# Patient Record
Sex: FEMALE | Race: WHITE | URBAN - METROPOLITAN AREA
[De-identification: names, ages, dates, MRNs, and addresses within clinical notes are randomized per-mention and may not be internally consistent; named-entity substitution may affect disease eponyms.]

---

## 2019-04-17 ENCOUNTER — OFFICE (OUTPATIENT)
Dept: URBAN - METROPOLITAN AREA CLINIC 75 | Facility: CLINIC | Age: 24
End: 2019-04-17

## 2019-04-17 VITALS
WEIGHT: 147 LBS | DIASTOLIC BLOOD PRESSURE: 62 MMHG | HEART RATE: 70 BPM | HEIGHT: 66 IN | SYSTOLIC BLOOD PRESSURE: 128 MMHG

## 2019-04-17 DIAGNOSIS — R19.4 CHANGE IN BOWEL HABIT: ICD-10-CM

## 2019-04-17 PROCEDURE — 99202 OFFICE O/P NEW SF 15 MIN: CPT

## 2019-04-30 ENCOUNTER — HOSPITAL ENCOUNTER (EMERGENCY)
Facility: HOSPITAL | Age: 24
Discharge: HOME | End: 2019-04-30
Attending: EMERGENCY MEDICINE | Admitting: EMERGENCY MEDICINE

## 2019-04-30 VITALS
HEART RATE: 63 BPM | RESPIRATION RATE: 16 BRPM | SYSTOLIC BLOOD PRESSURE: 109 MMHG | TEMPERATURE: 98.2 F | HEIGHT: 65 IN | BODY MASS INDEX: 26.79 KG/M2 | WEIGHT: 160.8 LBS | OXYGEN SATURATION: 94 % | DIASTOLIC BLOOD PRESSURE: 66 MMHG

## 2019-04-30 DIAGNOSIS — H66.91 RIGHT OTITIS MEDIA, UNSPECIFIED OTITIS MEDIA TYPE: Primary | ICD-10-CM

## 2019-04-30 PROCEDURE — 99283 EMERGENCY DEPT VISIT LOW MDM: CPT

## 2019-04-30 RX ORDER — AMOXICILLIN AND CLAVULANATE POTASSIUM 875; 125 MG/1; MG/1
1 TABLET, FILM COATED ORAL ONCE
Status: COMPLETED | OUTPATIENT
Start: 2019-04-30 | End: 2019-04-30

## 2019-04-30 RX ORDER — AMOXICILLIN AND CLAVULANATE POTASSIUM 875; 125 MG/1; MG/1
1 TABLET, FILM COATED ORAL EVERY 12 HOURS
Qty: 20 TABLET | Refills: 0 | Status: SHIPPED | OUTPATIENT
Start: 2019-04-30 | End: 2022-09-09 | Stop reason: SDUPTHER

## 2019-04-30 RX ORDER — SPIRONOLACTONE 25 MG/1
75 TABLET ORAL 2 TIMES DAILY
COMMUNITY

## 2019-04-30 RX ORDER — HYDROCODONE BITARTRATE AND ACETAMINOPHEN 7.5; 325 MG/1; MG/1
1 TABLET ORAL ONCE
Status: COMPLETED | OUTPATIENT
Start: 2019-04-30 | End: 2019-04-30

## 2019-04-30 RX ORDER — FLUTICASONE PROPIONATE 50 MCG
2 SPRAY, SUSPENSION (ML) NASAL DAILY
Qty: 16 G | Refills: 0 | Status: SHIPPED | OUTPATIENT
Start: 2019-04-30

## 2019-04-30 RX ORDER — CETIRIZINE HYDROCHLORIDE 10 MG/1
10 TABLET ORAL DAILY
Qty: 20 TABLET | Refills: 0 | Status: SHIPPED | OUTPATIENT
Start: 2019-04-30

## 2019-04-30 RX ADMIN — HYDROCODONE BITARTRATE AND ACETAMINOPHEN 1 TABLET: 7.5; 325 TABLET ORAL at 04:55

## 2019-04-30 RX ADMIN — AMOXICILLIN AND CLAVULANATE POTASSIUM 1 TABLET: 875; 125 TABLET, FILM COATED ORAL at 04:55

## 2019-05-03 ENCOUNTER — HOSPITAL ENCOUNTER (EMERGENCY)
Facility: HOSPITAL | Age: 24
Discharge: HOME | End: 2019-05-03
Attending: EMERGENCY MEDICINE | Admitting: EMERGENCY MEDICINE

## 2019-05-03 ENCOUNTER — APPOINTMENT (OUTPATIENT)
Dept: GENERAL RADIOLOGY | Facility: HOSPITAL | Age: 24
End: 2019-05-03

## 2019-05-03 VITALS
TEMPERATURE: 98.3 F | WEIGHT: 160 LBS | HEART RATE: 57 BPM | DIASTOLIC BLOOD PRESSURE: 70 MMHG | RESPIRATION RATE: 16 BRPM | SYSTOLIC BLOOD PRESSURE: 123 MMHG | OXYGEN SATURATION: 99 % | BODY MASS INDEX: 26.66 KG/M2 | HEIGHT: 65 IN

## 2019-05-03 DIAGNOSIS — J06.9 UPPER RESPIRATORY TRACT INFECTION, UNSPECIFIED TYPE: ICD-10-CM

## 2019-05-03 DIAGNOSIS — H92.01 RIGHT EAR PAIN: Primary | ICD-10-CM

## 2019-05-03 PROCEDURE — 99283 EMERGENCY DEPT VISIT LOW MDM: CPT

## 2019-05-03 PROCEDURE — 71046 X-RAY EXAM CHEST 2 VIEWS: CPT

## 2019-05-03 RX ORDER — ALBUTEROL SULFATE 90 UG/1
2 AEROSOL, METERED RESPIRATORY (INHALATION) EVERY 4 HOURS PRN
Qty: 1 INHALER | Refills: 0 | Status: SHIPPED | OUTPATIENT
Start: 2019-05-03

## 2019-05-03 NOTE — ED NOTES
Pt and family member given apple juice and bags of chips as requested. No further needs at this time.      Anette Harris, RN  05/03/19 4914

## 2019-05-03 NOTE — ED NOTES
"This RN was called into pt's room. Pt expressing concern that \"I was sent here by a doctor that saw the micro-perforation and yellow drainage in my ear, but the doctor here says he doesn't see anything. I even had him look twice. All he seems to be focused on is my cough. I've had so many ear surgeries I know when I have an infection. I came here a couple of days ago and the doctor did not give me the proper medicine to treat my ear infection. I'm afraid I'm going to lose more hearing because I'm being misdiagnosed at this hospital. I need something for the pain in my ear and to help the infection. Do I need to talk to another ER doctor or call an ENT to come see me here?\" Informed pt that I would notify pt's primary ER physician. Dr. Sanchez informed at this time.      Anette Harris, RN  05/03/19 9321    "

## 2019-05-03 NOTE — ED NOTES
Pt has hx of asthmatic attacks without asthma dx and also complains of soa.  Pt in NOD at this time.     Stephanie Renteria N  05/03/19 5403

## 2019-05-03 NOTE — DISCHARGE INSTRUCTIONS
Continue Augmentin as directed  Contact the ear nose and throat doctor as per referral for further evaluation of the right ear pain.

## 2019-05-03 NOTE — ED NOTES
Came from [facility] clinic-dr thinks pt may have pneumonia and a perforated ear drum.  Pt has had several ear surgeries and complains of pain in right ear.     Stephanie Renteria N  05/03/19 5381

## 2019-05-03 NOTE — ED PROVIDER NOTES
EMERGENCY DEPARTMENT ENCOUNTER    CHIEF COMPLAINT  Chief Complaint: cough  History given by: patient  History limited by: nothing  Room Number:   PMD: Renzo Mullins MD      HPI:  Pt is a 23 y.o. female who presents complaining of an occasionally productive cough with yellow phlegm that began 48 hours ago. She also complains of SOA and a HA. She has left sided and central chest pain that is worse when she coughs. Pt denies fever. Pt has trouble with her right ear but has an apt with her ENT.     Duration/Onset/Timin hours, gradual, constant  Location: n/a  Radiation: n/a  Quality: occasionally productive with yellow phlegm  Intensity/Severity: moderate  Associated Symptoms: SOA, HA, left sided and central chest pain worse when she coughs, right ear pain  Aggravating or Alleviating Factors: none  Previous Episodes: none      PAST MEDICAL HISTORY  Active Ambulatory Problems     Diagnosis Date Noted   • No Active Ambulatory Problems     Resolved Ambulatory Problems     Diagnosis Date Noted   • No Resolved Ambulatory Problems     Past Medical History:   Diagnosis Date   • Sinus infection        PAST SURGICAL HISTORY  Past Surgical History:   Procedure Laterality Date   • EAR TUBES     • INNER EAR SURGERY      multiple   • MIDDLE EAR SURGERY      multiple       FAMILY HISTORY  History reviewed. No pertinent family history.    SOCIAL HISTORY  Social History     Socioeconomic History   • Marital status: Single     Spouse name: Not on file   • Number of children: Not on file   • Years of education: Not on file   • Highest education level: Not on file   Tobacco Use   • Smoking status: Never Smoker   Substance and Sexual Activity   • Alcohol use: Yes     Comment: socially   • Drug use: No       ALLERGIES  Patient has no known allergies.    REVIEW OF SYSTEMS  Review of Systems   Constitutional: Negative.  Negative for fever.   HENT: Positive for ear pain (right (chronic)). Negative for sore throat.    Eyes:  Negative.    Respiratory: Positive for cough (productive with yellow phelgm) and shortness of breath.    Cardiovascular: Positive for chest pain (left sided and central, worse with coughing).   Gastrointestinal: Negative.    Genitourinary: Negative.  Negative for dysuria.   Musculoskeletal: Negative.  Negative for back pain.   Skin: Negative.  Negative for rash.   Neurological: Positive for headaches.   All other systems reviewed and are negative.      PHYSICAL EXAM  ED Triage Vitals   Temp Heart Rate Resp BP SpO2   05/03/19 1319 05/03/19 1319 05/03/19 1333 05/03/19 1333 05/03/19 1319   98.3 °F (36.8 °C) 77 16 105/85 98 %      Temp src Heart Rate Source Patient Position BP Location FiO2 (%)   05/03/19 1319 05/03/19 1319 05/03/19 1333 05/03/19 1333 --   Tympanic Monitor Sitting Right arm        Physical Exam   Constitutional: No distress.   HENT:   Head: Normocephalic and atraumatic.   Right ear-partial cerumen occlusion was removed with loop.  The right TM is abnormal in appearance likely related to prior surgical repair.  I see no obvious perforation or signs of infection.   Eyes: Conjunctivae are normal.   Neck: Normal range of motion.   Cardiovascular: Normal rate and regular rhythm.   Pulmonary/Chest: No respiratory distress. She has rhonchi (moderate rhonchi bilaterally). She has no rales.   Abdominal: There is no tenderness.   Musculoskeletal: She exhibits no edema or tenderness.   Neurological: She is alert.   Skin: No rash noted.   Nursing note and vitals reviewed.      RADIOLOGY  XR Chest 2 View    (Results Pending)      Reviewed CXR which shows nothing acute. Independently viewed by me. Interpreted by radiologist.    I ordered the above noted radiological studies. Interpreted by radiologist.  Reviewed by me in PACS.       PROCEDURES  Procedures      PROGRESS AND CONSULTS     1421  Ordered CXR for further evaluation.     1532  Rechecked Pt who is resting comfortably. Informed her that her CXR shows nothing  acute. I will give her an inhaler to treat her cough. Discussed plans for discharge and instructed her to continue taking the augmentin that she was given when she was seen here recently in the ER for ear pain.  Instructed her to keep her scheduled f/u with ENT so that she can have further evaluation of her ear. Pt understands and agrees to all plans. All questions answered.     MEDICAL DECISION MAKING  Results were reviewed/discussed with the patient and they were also made aware of online access. Pt also made aware that some labs, such as cultures, will not be resulted during ER visit and follow up with PMD is necessary.     MDM  Number of Diagnoses or Management Options     Amount and/or Complexity of Data Reviewed  Tests in the radiology section of CPT®: ordered and reviewed (CXR shows nothing acute.)           DIAGNOSIS  Final diagnoses:   Right ear pain   Upper respiratory tract infection, unspecified type       DISPOSITION  DISCHARGE    Patient discharged in stable condition.    Reviewed implications of results, diagnosis, meds, responsibility to follow up, warning signs and symptoms of possible worsening, potential complications and reasons to return to ER, including new or worsening symptoms.    Patient/Family voiced understanding of above instructions.    Discussed plan for discharge, as there is no emergent indication for admission. Patient referred to primary care provider for BP management due to today's BP. Pt/family is agreeable and understands need for follow up and repeat testing.  Pt is aware that discharge does not mean that nothing is wrong but it indicates no emergency is present that requires admission and they must continue care with follow-up as given below or physician of their choice.     FOLLOW-UP  Byron Antoine MD  [address] [id number] [phone]      Call for Appointment         Medication List      New Prescriptions    albuterol sulfate  (90 Base)  MCG/ACT inhaler  Commonly known as:  PROVENTIL HFA;VENTOLIN HFA;PROAIR HFA  Inhale 2 puffs Every 4 (Four) Hours As Needed for Wheezing.              Latest Documented Vital Signs:  As of 3:35 PM  BP- 115/85 HR- 57 Temp- 98.3 °F (36.8 °C) (Tympanic) O2 sat- 98%    --  Documentation assistance provided by lonnie Alfaro for Dr. Sanchez.  Information recorded by the scribe was done at my direction and has been verified and validated by me.       Aurea Alfaro  05/03/19 5325       Dano Sanchez MD  05/03/19 7420

## 2019-06-19 ENCOUNTER — OFFICE (OUTPATIENT)
Dept: URBAN - METROPOLITAN AREA CLINIC 75 | Facility: CLINIC | Age: 24
End: 2019-06-19

## 2019-06-19 VITALS
DIASTOLIC BLOOD PRESSURE: 84 MMHG | WEIGHT: 149 LBS | SYSTOLIC BLOOD PRESSURE: 126 MMHG | HEIGHT: 66 IN | HEART RATE: 58 BPM

## 2019-06-19 DIAGNOSIS — K59.00 CONSTIPATION, UNSPECIFIED: ICD-10-CM

## 2019-06-19 DIAGNOSIS — R10.9 UNSPECIFIED ABDOMINAL PAIN: ICD-10-CM

## 2019-06-19 PROCEDURE — 99214 OFFICE O/P EST MOD 30 MIN: CPT | Performed by: NURSE PRACTITIONER

## 2019-06-19 RX ORDER — DICYCLOMINE HYDROCHLORIDE 10 MG/1
30 CAPSULE ORAL
Qty: 90 | Refills: 4 | Status: ACTIVE
Start: 2019-06-19

## 2022-09-09 ENCOUNTER — HOSPITAL ENCOUNTER (EMERGENCY)
Facility: HOSPITAL | Age: 27
Discharge: HOME | End: 2022-09-09
Attending: EMERGENCY MEDICINE | Admitting: EMERGENCY MEDICINE

## 2022-09-09 VITALS
SYSTOLIC BLOOD PRESSURE: 118 MMHG | OXYGEN SATURATION: 99 % | DIASTOLIC BLOOD PRESSURE: 73 MMHG | TEMPERATURE: 97.6 F | HEART RATE: 91 BPM | RESPIRATION RATE: 18 BRPM

## 2022-09-09 DIAGNOSIS — H60.501 ACUTE OTITIS EXTERNA OF RIGHT EAR, UNSPECIFIED TYPE: ICD-10-CM

## 2022-09-09 DIAGNOSIS — H66.91 RIGHT OTITIS MEDIA, UNSPECIFIED OTITIS MEDIA TYPE: Primary | ICD-10-CM

## 2022-09-09 PROCEDURE — 99282 EMERGENCY DEPT VISIT SF MDM: CPT

## 2022-09-09 RX ORDER — AMOXICILLIN AND CLAVULANATE POTASSIUM 875; 125 MG/1; MG/1
1 TABLET, FILM COATED ORAL EVERY 12 HOURS
Qty: 20 TABLET | Refills: 0 | Status: SHIPPED | OUTPATIENT
Start: 2022-09-09

## 2022-09-09 RX ORDER — CIPROFLOXACIN/HYDROCORTISONE 0.2 %-1 %
3 SUSPENSION, DROPS(FINAL DOSAGE FORM)(ML) OTIC (EAR) 2 TIMES DAILY
Qty: 10 ML | Refills: 0 | Status: SHIPPED | OUTPATIENT
Start: 2022-09-09

## 2022-09-09 NOTE — ED TRIAGE NOTES
PAtient to ER via car from home for Right ear pain x 8-9 days. Jaylene states she was seen and put on abx but itsn not getting better    Patient wearing mask this RN in PPE

## 2022-09-09 NOTE — ED PROVIDER NOTES
EMERGENCY DEPARTMENT ENCOUNTER  I wore full protective equipment throughout this patient encounter including a N95 mask, eye shield, gown and gloves. Hand hygiene was performed before donning protective equipment and after removal when leaving the room.    Room Number:  B01/01  Date of encounter:  9/9/2022  PCP: Renzo Mullins MD    HPI:  Context: Natalya Huynh is a 26 y.o. female who presents to the ED c/o chief complaint of right ear pain.  Patient reports that she was seen at outside emergency department approximately 8 days ago, diagnosed with otitis media and otitis externa.  Patient reports that she was started on oral antibiotics as well as a drop for her ear, reports that she has been taking antibiotics without relief, was not able to afford the drops for her ear secondary to lack of insurance although she now has insurance again.  Patient complains of continued pain and pressure in her ear.  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  Patient denies any external swelling, no fever shakes chills or night sweats.    MEDICAL HISTORY REVIEW  Reviewed in EPIC    PAST MEDICAL HISTORY  Active Ambulatory Problems     Diagnosis Date Noted   • No Active Ambulatory Problems     Resolved Ambulatory Problems     Diagnosis Date Noted   • No Resolved Ambulatory Problems     Past Medical History:   Diagnosis Date   • Sinus infection        PAST SURGICAL HISTORY  Past Surgical History:   Procedure Laterality Date   • EAR TUBES     • INNER EAR SURGERY      multiple   • MIDDLE EAR SURGERY      multiple       FAMILY HISTORY  No family history on file.    SOCIAL HISTORY  Social History     Socioeconomic History   • Marital status: Single   Tobacco Use   • Smoking status: Never Smoker   • Smokeless tobacco: Never Used   Substance and Sexual Activity   • Alcohol use: Yes     Comment: socially   • Drug use: No       ALLERGIES  Patient has no known  allergies.    The patient's allergies have been reviewed    REVIEW OF SYSTEMS  All systems reviewed and negative except for those discussed in HPI.     PHYSICAL EXAM  I have reviewed the triage vital signs and nursing notes.  ED Triage Vitals [09/09/22 1316]   Temp Heart Rate Resp BP SpO2   97.6 °F (36.4 °C) 91 18 -- 99 %      Temp src Heart Rate Source Patient Position BP Location FiO2 (%)   -- -- -- -- --       General: No acute distress.  HENT: NCAT, PERRL, Nares patent.  Right external ear is normal in appearance, normal lie to the pinna, no mastoid swelling or tenderness, no pain with manipulation of tragus.  Ear canal is extremely swollen, slightly erythematous.  Difficult to visualize tympanic membrane but small area of tympanic membrane visualized, membrane intact, membrane bulging.  Eyes: no scleral icterus.  Neck: trachea midline, no ROM limitations.  CV: regular rhythm, regular rate.  Respiratory: normal effort, CTAB.  Abdomen: soft, nondistended, NTTP, no rebound tenderness, no guarding or rigidity.  Musculoskeletal: no deformity.  Neuro: alert, moves all extremities, follows commands.  Skin: warm, dry.    LAB RESULTS  No results found for this or any previous visit (from the past 24 hour(s)).    I ordered the above labs and reviewed the results.    RADIOLOGY  No Radiology Exams Resulted Within Past 24 Hours    I ordered the above noted radiological studies. I reviewed the images and results. I agree with the radiologist interpretation.    PROCEDURES  Procedures    MEDICATIONS GIVEN IN ER  Medications - No data to display    PROGRESS, DATA ANALYSIS, CONSULTS, AND MEDICAL DECISION MAKING  A complete history and physical exam have been performed.  All available laboratory and imaging results have been reviewed by myself prior to disposition.    MDM  After the initial H&P, I discussed pertinent information from history and physical exam with patient/family.  Discussed differential diagnosis.  Discussed plan  for ED evaluation/workup/treatment.  All questions answered.  Patient/family is agreeable with plan.  ED Course as of 09/09/22 1404   Fri Sep 09, 2022   1404 Patient with otitis externa otitis media.  Patient has not completed treatment with antibiotics but is not having improvement, will refill antibiotics for longer course, patient previously unable to afford drops or ears but now she states that her insurance should cover them, prescribing antibiotic drops.  Discussed need for close follow-up with primary care and ENT.  Given extensive discussion return precautions, discharging. [JG]      ED Course User Index  [JG] Mookie Radford MD       AS OF 14:04 EDT VITALS:    BP - 118/73  HR - 91  TEMP - 97.6 °F (36.4 °C)  O2 SATS - 99%    DIAGNOSIS  Final diagnoses:   Right otitis media, unspecified otitis media type   Acute otitis externa of right ear, unspecified type         DISPOSITION  DISCHARGE    Patient discharged in stable condition.    Reviewed implications of results, diagnosis, meds, responsibility to follow up, warning signs and symptoms of possible worsening, potential complications and reasons to return to ER.    Patient/Family voiced understanding of above instructions.    Discussed plan for discharge, as there is no emergent indication for admission. Patient referred to primary care provider for BP management due to today's BP. Pt/family is agreeable and understands need for follow up and repeat testing.  Pt is aware that discharge does not mean that nothing is wrong but it indicates no emergency is present that requires admission and they must continue care with follow-up as given below or physician of their choice.     FOLLOW-UP  Renzo Mullins MD  [address] [facility] [address]  [facility] [id number] [phone]    Schedule an appointment as soon as possible for a visit in 2 days  even if well    Bony Rudd MD  [address] [facility] [address]  [facility] [id number] [phone]    Schedule an  appointment as soon as possible for a visit in 2 days           Medication List      New Prescriptions    Cipro HC 0.2-1 % otic suspension  Generic drug: ciprofloxacin-hydrocortisone  Administer 3 drops to the right ear 2 (Two) Times a Day.           Where to Get Your Medications      These medications were sent to [facility] [address] [place] [address] [address] [facility] [address] - [phone] [facility] [phone]   [address] [facility], [place] [id number]    Phone: [phone]   · amoxicillin-clavulanate 875-125 MG per tablet  · Cipro HC 0.2-1 % otic suspension          Mookie Radford MD  09/09/22 3689

## 2024-01-03 ENCOUNTER — APPOINTMENT (OUTPATIENT)
Dept: GENERAL RADIOLOGY | Facility: HOSPITAL | Age: 29
End: 2024-01-03
Payer: MEDICAID

## 2024-01-03 ENCOUNTER — HOSPITAL ENCOUNTER (EMERGENCY)
Facility: HOSPITAL | Age: 29
Discharge: HOME | End: 2024-01-03
Attending: EMERGENCY MEDICINE | Admitting: EMERGENCY MEDICINE
Payer: MEDICAID

## 2024-01-03 VITALS
TEMPERATURE: 98.8 F | RESPIRATION RATE: 16 BRPM | SYSTOLIC BLOOD PRESSURE: 137 MMHG | OXYGEN SATURATION: 100 % | HEIGHT: 64 IN | BODY MASS INDEX: 31.58 KG/M2 | WEIGHT: 185 LBS | DIASTOLIC BLOOD PRESSURE: 67 MMHG | HEART RATE: 73 BPM

## 2024-01-03 DIAGNOSIS — U07.1 COVID-19 VIRUS INFECTION: ICD-10-CM

## 2024-01-03 DIAGNOSIS — R05.9 COUGH, UNSPECIFIED TYPE: Primary | ICD-10-CM

## 2024-01-03 LAB
B PARAPERT DNA SPEC QL NAA+PROBE: NOT DETECTED
B PERT DNA SPEC QL NAA+PROBE: NOT DETECTED
C PNEUM DNA NPH QL NAA+NON-PROBE: NOT DETECTED
FLUAV SUBTYP SPEC NAA+PROBE: NOT DETECTED
FLUBV RNA ISLT QL NAA+PROBE: NOT DETECTED
HADV DNA SPEC NAA+PROBE: NOT DETECTED
HCOV 229E RNA SPEC QL NAA+PROBE: NOT DETECTED
HCOV HKU1 RNA SPEC QL NAA+PROBE: NOT DETECTED
HCOV NL63 RNA SPEC QL NAA+PROBE: NOT DETECTED
HCOV OC43 RNA SPEC QL NAA+PROBE: NOT DETECTED
HMPV RNA NPH QL NAA+NON-PROBE: NOT DETECTED
HPIV1 RNA ISLT QL NAA+PROBE: NOT DETECTED
HPIV2 RNA SPEC QL NAA+PROBE: NOT DETECTED
HPIV3 RNA NPH QL NAA+PROBE: NOT DETECTED
HPIV4 P GENE NPH QL NAA+PROBE: NOT DETECTED
M PNEUMO IGG SER IA-ACNC: NOT DETECTED
RHINOVIRUS RNA SPEC NAA+PROBE: NOT DETECTED
RSV RNA NPH QL NAA+NON-PROBE: NOT DETECTED
SARS-COV-2 RNA NPH QL NAA+NON-PROBE: DETECTED

## 2024-01-03 PROCEDURE — 63710000001 ONDANSETRON ODT 4 MG TABLET DISPERSIBLE: Performed by: EMERGENCY MEDICINE

## 2024-01-03 PROCEDURE — 99283 EMERGENCY DEPT VISIT LOW MDM: CPT

## 2024-01-03 PROCEDURE — 71046 X-RAY EXAM CHEST 2 VIEWS: CPT

## 2024-01-03 PROCEDURE — 0202U NFCT DS 22 TRGT SARS-COV-2: CPT | Performed by: EMERGENCY MEDICINE

## 2024-01-03 RX ORDER — ONDANSETRON 4 MG/1
4 TABLET, ORALLY DISINTEGRATING ORAL ONCE
Status: COMPLETED | OUTPATIENT
Start: 2024-01-03 | End: 2024-01-03

## 2024-01-03 RX ORDER — IBUPROFEN 800 MG/1
800 TABLET ORAL ONCE
Status: COMPLETED | OUTPATIENT
Start: 2024-01-03 | End: 2024-01-03

## 2024-01-03 RX ADMIN — ONDANSETRON 4 MG: 4 TABLET, ORALLY DISINTEGRATING ORAL at 22:43

## 2024-01-03 RX ADMIN — IBUPROFEN 800 MG: 800 TABLET, FILM COATED ORAL at 22:43

## 2024-01-04 NOTE — ED PROVIDER NOTES
EMERGENCY DEPARTMENT ENCOUNTER    Room Number:  40/40  PCP: Renzo Mullins MD  Historian: Patient      HPI:  Chief Complaint: Cough and congestion  A complete HPI/ROS/PMH/PSH/SH/FH are unobtainable due to: None  Context: Natalya Huynh is a 28 y.o. female who presents to the ED c/o cough and congestion.  States she has had cough and congestion for a week.  Got worse over the last day.  Patient has had no diarrhea.  Has had some posttussive emesis.  Has had fevers and chills.  No chest pain pressure tightness.            PAST MEDICAL HISTORY  Active Ambulatory Problems     Diagnosis Date Noted    No Active Ambulatory Problems     Resolved Ambulatory Problems     Diagnosis Date Noted    No Resolved Ambulatory Problems     Past Medical History:   Diagnosis Date    Environmental allergies     Migraine     Sinus infection          PAST SURGICAL HISTORY  Past Surgical History:   Procedure Laterality Date    EAR TUBES      INNER EAR SURGERY      multiple    MIDDLE EAR SURGERY      multiple         FAMILY HISTORY  No family history on file.      SOCIAL HISTORY  Social History     Socioeconomic History    Marital status: Single   Tobacco Use    Smoking status: Never     Passive exposure: Current    Smokeless tobacco: Never   Vaping Use    Vaping Use: Never used   Substance and Sexual Activity    Alcohol use: Yes     Comment: socially    Drug use: No    Sexual activity: Defer         ALLERGIES  Gluten meal        REVIEW OF SYSTEMS  Review of Systems   Congestion body aches      PHYSICAL EXAM  ED Triage Vitals   Temp Heart Rate Resp BP SpO2   01/03/24 2012 01/03/24 2012 01/03/24 2012 01/03/24 2016 01/03/24 2012   98.8 °F (37.1 °C) 73 16 137/67 100 %      Temp src Heart Rate Source Patient Position BP Location FiO2 (%)   01/03/24 2012 01/03/24 2012 01/03/24 2016 01/03/24 2016 --   Tympanic Monitor Sitting Right arm        Physical Exam      GENERAL: no acute distress  HENT: nares patent  EYES: no scleral icterus  CV: regular  rhythm, normal rate  RESPIRATORY: normal effort  ABDOMEN: soft  MUSCULOSKELETAL: no deformity  NEURO: alert, moves all extremities, follows commands  PSYCH:  calm, cooperative  SKIN: warm, dry    Vital signs and nursing notes reviewed.          LAB RESULTS  Recent Results (from the past 24 hour(s))   Respiratory Panel PCR w/COVID-19(SARS-CoV-2) FRANCES/KEYON/MEHRAN/PAD/COR/STEPHAN In-House, NP Swab in UTM/VTM, 2 HR TAT - Swab, Nasopharynx    Collection Time: 01/03/24  8:49 PM    Specimen: Nasopharynx; Swab   Result Value Ref Range    ADENOVIRUS, PCR Not Detected Not Detected    Coronavirus 229E Not Detected Not Detected    Coronavirus HKU1 Not Detected Not Detected    Coronavirus NL63 Not Detected Not Detected    Coronavirus OC43 Not Detected Not Detected    COVID19 Detected (C) Not Detected - Ref. Range    Human Metapneumovirus Not Detected Not Detected    Human Rhinovirus/Enterovirus Not Detected Not Detected    Influenza A PCR Not Detected Not Detected    Influenza B PCR Not Detected Not Detected    Parainfluenza Virus 1 Not Detected Not Detected    Parainfluenza Virus 2 Not Detected Not Detected    Parainfluenza Virus 3 Not Detected Not Detected    Parainfluenza Virus 4 Not Detected Not Detected    RSV, PCR Not Detected Not Detected    Bordetella pertussis pcr Not Detected Not Detected    Bordetella parapertussis PCR Not Detected Not Detected    Chlamydophila pneumoniae PCR Not Detected Not Detected    Mycoplasma pneumo by PCR Not Detected Not Detected       Ordered the above labs and reviewed the results.        RADIOLOGY  XR Chest 2 View    Result Date: 1/3/2024  XR CHEST 2 VW-  INDICATION: Cough  COMPARISON: 5/3/2019      No focal consolidation. No pleural effusion or pneumothorax.  Normal size cardiomediastinal silhouette.  No focal osseous abnormality.   This report was finalized on 1/3/2024 10:36 PM by Dr. Delfin Friend M.D on Workstation: [id number] [employer]       Ordered the above noted radiological studies.  Chest x-ray  independently interpreted by me and shows no evidence of pneumonia          PROCEDURES  Procedures            MEDICATIONS GIVEN IN ER  Medications   ibuprofen (ADVIL,MOTRIN) tablet 800 mg (has no administration in time range)   ondansetron ODT (ZOFRAN-ODT) disintegrating tablet 4 mg (has no administration in time range)                   MEDICAL DECISION MAKING, PROGRESS, and CONSULTS     Discussion below represents my analysis of pertinent findings related to patient's condition, differential diagnosis, treatment plan and final disposition.      Additional sources:  - Discussed/ obtained information from independent historians: None    - External (non-ED) record review: Epic reviewed patient last seen by OB/GYN 9/6/2023 for gynecologic exam    - Chronic or social conditions impacting care: None    - Shared decision making: None      Orders placed during this visit:  Orders Placed This Encounter   Procedures    Respiratory Panel PCR w/COVID-19(SARS-CoV-2) FRANCES/KEYON/MEHRAN/PAD/COR/STEPHAN In-House, NP Swab in UTM/VTM, 2 HR TAT - Swab, Nasopharynx    XR Chest 2 View         Additional orders considered but not ordered:  None        Differential diagnosis includes but is not limited to:    COVID-19 infection versus pneumonia versus sepsis      Independent interpretation of labs, radiology studies, and discussions with consultants:  ED Course as of 01/03/24 2243   Wed Jan 03, 2024 2237 22:38 EST  Patient presents with cough congestion and appears to have COVID infection.  Chest x-ray to my eye appears that there is no pneumonia.  Patient will be discharged home.  Instructed to follow-up with primary provider. [SL]      ED Course User Index  [SL] Bhavesh Fernández MD                 DIAGNOSIS  Final diagnoses:   Cough, unspecified type   COVID-19 virus infection         DISPOSITION  DISCHARGE    Patient discharged in stable condition.    Reviewed implications of results, diagnosis, meds, responsibility to follow up, warning  signs and symptoms of possible worsening, potential complications and reasons to return to ER, including worsening symptoms    Patient/Family voiced understanding of above instructions.    Discussed plan for discharge, as there is no emergent indication for admission. Patient referred to primary care provider for BP management due to today's BP. Pt/family is agreeable and understands need for follow up and repeat testing.  Pt is aware that discharge does not mean that nothing is wrong but it indicates no emergency is present that requires admission and they must continue care with follow-up as given below or physician of their choice.     FOLLOW-UP  Renzo Mlulins MD  [address] [address] [phone]    Schedule an appointment as soon as possible for a visit            Medication List      No changes were made to your prescriptions during this visit.                  Latest Documented Vital Signs:  As of 22:43 EST  BP- 137/67 HR- 73 Temp- 98.8 °F (37.1 °C) (Tympanic) O2 sat- 100%              --    Please note that portions of this were completed with a voice recognition program.       Note Disclaimer: At [facility], we believe that sharing information builds trust and better relationships. You are receiving this note because you are receiving care at [facility] or recently visited. It is possible you will see health information before a provider has talked with you about it. This kind of information can be easy to misunderstand. To help you fully understand what it means for your health, we urge you to discuss this note with your provider.            Bhavesh Fernández MD  01/03/24 2383

## 2024-02-13 ENCOUNTER — HOSPITAL ENCOUNTER (EMERGENCY)
Facility: HOSPITAL | Age: 29
Discharge: HOME | End: 2024-02-13
Attending: EMERGENCY MEDICINE | Admitting: EMERGENCY MEDICINE
Payer: MEDICAID

## 2024-02-13 ENCOUNTER — APPOINTMENT (OUTPATIENT)
Dept: GENERAL RADIOLOGY | Facility: HOSPITAL | Age: 29
End: 2024-02-13
Payer: MEDICAID

## 2024-02-13 VITALS
BODY MASS INDEX: 32.49 KG/M2 | HEART RATE: 60 BPM | SYSTOLIC BLOOD PRESSURE: 118 MMHG | HEIGHT: 65 IN | OXYGEN SATURATION: 100 % | DIASTOLIC BLOOD PRESSURE: 87 MMHG | TEMPERATURE: 98 F | RESPIRATION RATE: 15 BRPM | WEIGHT: 195 LBS

## 2024-02-13 DIAGNOSIS — T63.91XA TOXIC EFFECT OF VENOM, ACCIDENTAL OR UNINTENTIONAL, INITIAL ENCOUNTER: ICD-10-CM

## 2024-02-13 DIAGNOSIS — L02.211 ABSCESS OF ABDOMINAL WALL: Primary | ICD-10-CM

## 2024-02-13 LAB
ALBUMIN SERPL-MCNC: 3.6 G/DL (ref 3.5–5.2)
ALBUMIN/GLOB SERPL: 1.3 G/DL
ALP SERPL-CCNC: 49 U/L (ref 39–117)
ALT SERPL W P-5'-P-CCNC: 9 U/L (ref 1–33)
ANION GAP SERPL CALCULATED.3IONS-SCNC: 8.2 MMOL/L (ref 5–15)
APTT PPP: 26.6 SECONDS (ref 24.3–38.1)
AST SERPL-CCNC: 13 U/L (ref 1–32)
BACTERIA UR QL AUTO: ABNORMAL /HPF
BASOPHILS # BLD AUTO: 0.03 10*3/MM3 (ref 0–0.2)
BASOPHILS NFR BLD AUTO: 0.5 % (ref 0–1.5)
BILIRUB SERPL-MCNC: 0.3 MG/DL (ref 0–1.2)
BILIRUB UR QL STRIP: NEGATIVE
BUN SERPL-MCNC: 10 MG/DL (ref 6–20)
BUN/CREAT SERPL: 12 (ref 7–25)
CALCIUM SPEC-SCNC: 8.7 MG/DL (ref 8.6–10.5)
CHLORIDE SERPL-SCNC: 107 MMOL/L (ref 98–107)
CK SERPL-CCNC: 64 U/L (ref 20–180)
CLARITY UR: ABNORMAL
CO2 SERPL-SCNC: 25.8 MMOL/L (ref 22–29)
COLOR UR: YELLOW
CREAT SERPL-MCNC: 0.83 MG/DL (ref 0.57–1)
D DIMER PPP FEU-MCNC: 0.46 MCGFEU/ML (ref 0–0.5)
DEPRECATED RDW RBC AUTO: 45.1 FL (ref 37–54)
EGFRCR SERPLBLD CKD-EPI 2021: 98 ML/MIN/1.73
EOSINOPHIL # BLD AUTO: 0.14 10*3/MM3 (ref 0–0.4)
EOSINOPHIL NFR BLD AUTO: 2.2 % (ref 0.3–6.2)
ERYTHROCYTE [DISTWIDTH] IN BLOOD BY AUTOMATED COUNT: 12.8 % (ref 12.3–15.4)
GLOBULIN UR ELPH-MCNC: 2.7 GM/DL
GLUCOSE SERPL-MCNC: 76 MG/DL (ref 65–99)
GLUCOSE UR STRIP-MCNC: NEGATIVE MG/DL
HCT VFR BLD AUTO: 40.2 % (ref 34–46.6)
HGB BLD-MCNC: 13.4 G/DL (ref 12–15.9)
HGB UR QL STRIP.AUTO: ABNORMAL
HYALINE CASTS UR QL AUTO: ABNORMAL /LPF
IMM GRANULOCYTES # BLD AUTO: 0.02 10*3/MM3 (ref 0–0.05)
IMM GRANULOCYTES NFR BLD AUTO: 0.3 % (ref 0–0.5)
INR PPP: 0.93 (ref 0.9–1.1)
KETONES UR QL STRIP: NEGATIVE
LEUKOCYTE ESTERASE UR QL STRIP.AUTO: NEGATIVE
LYMPHOCYTES # BLD AUTO: 1.88 10*3/MM3 (ref 0.7–3.1)
LYMPHOCYTES NFR BLD AUTO: 29.3 % (ref 19.6–45.3)
MCH RBC QN AUTO: 32.1 PG (ref 26.6–33)
MCHC RBC AUTO-ENTMCNC: 33.3 G/DL (ref 31.5–35.7)
MCV RBC AUTO: 96.4 FL (ref 79–97)
MONOCYTES # BLD AUTO: 0.77 10*3/MM3 (ref 0.1–0.9)
MONOCYTES NFR BLD AUTO: 12 % (ref 5–12)
NEUTROPHILS NFR BLD AUTO: 3.57 10*3/MM3 (ref 1.7–7)
NEUTROPHILS NFR BLD AUTO: 55.7 % (ref 42.7–76)
NITRITE UR QL STRIP: NEGATIVE
NRBC BLD AUTO-RTO: 0 /100 WBC (ref 0–0.2)
NT-PROBNP SERPL-MCNC: 368.8 PG/ML (ref 0–450)
PH UR STRIP.AUTO: 6 [PH] (ref 4.5–8)
PLATELET # BLD AUTO: 208 10*3/MM3 (ref 140–450)
PMV BLD AUTO: 11.7 FL (ref 6–12)
POTASSIUM SERPL-SCNC: 4.1 MMOL/L (ref 3.5–5.2)
PROT SERPL-MCNC: 6.3 G/DL (ref 6–8.5)
PROT UR QL STRIP: NEGATIVE
PROTHROMBIN TIME: 12.5 SECONDS (ref 12.1–15)
RBC # BLD AUTO: 4.17 10*6/MM3 (ref 3.77–5.28)
RBC # UR STRIP: ABNORMAL /HPF
REF LAB TEST METHOD: ABNORMAL
SODIUM SERPL-SCNC: 141 MMOL/L (ref 136–145)
SP GR UR STRIP: 1.03 (ref 1–1.03)
SQUAMOUS #/AREA URNS HPF: ABNORMAL /HPF
TROPONIN T SERPL HS-MCNC: <6 NG/L
UROBILINOGEN UR QL STRIP: ABNORMAL
WBC # UR STRIP: ABNORMAL /HPF
WBC NRBC COR # BLD AUTO: 6.41 10*3/MM3 (ref 3.4–10.8)

## 2024-02-13 PROCEDURE — 80053 COMPREHEN METABOLIC PANEL: CPT | Performed by: EMERGENCY MEDICINE

## 2024-02-13 PROCEDURE — 96374 THER/PROPH/DIAG INJ IV PUSH: CPT

## 2024-02-13 PROCEDURE — 85730 THROMBOPLASTIN TIME PARTIAL: CPT | Performed by: EMERGENCY MEDICINE

## 2024-02-13 PROCEDURE — 82550 ASSAY OF CK (CPK): CPT | Performed by: EMERGENCY MEDICINE

## 2024-02-13 PROCEDURE — 85379 FIBRIN DEGRADATION QUANT: CPT | Performed by: EMERGENCY MEDICINE

## 2024-02-13 PROCEDURE — 84484 ASSAY OF TROPONIN QUANT: CPT | Performed by: EMERGENCY MEDICINE

## 2024-02-13 PROCEDURE — 71045 X-RAY EXAM CHEST 1 VIEW: CPT

## 2024-02-13 PROCEDURE — 85025 COMPLETE CBC W/AUTO DIFF WBC: CPT | Performed by: EMERGENCY MEDICINE

## 2024-02-13 PROCEDURE — 25010000002 KETOROLAC TROMETHAMINE PER 15 MG: Performed by: EMERGENCY MEDICINE

## 2024-02-13 PROCEDURE — 99283 EMERGENCY DEPT VISIT LOW MDM: CPT

## 2024-02-13 PROCEDURE — 85610 PROTHROMBIN TIME: CPT | Performed by: EMERGENCY MEDICINE

## 2024-02-13 PROCEDURE — 83880 ASSAY OF NATRIURETIC PEPTIDE: CPT | Performed by: EMERGENCY MEDICINE

## 2024-02-13 PROCEDURE — 81001 URINALYSIS AUTO W/SCOPE: CPT | Performed by: EMERGENCY MEDICINE

## 2024-02-13 RX ORDER — IBUPROFEN 600 MG/1
600 TABLET ORAL 3 TIMES DAILY
Qty: 30 TABLET | Refills: 0 | Status: SHIPPED | OUTPATIENT
Start: 2024-02-13

## 2024-02-13 RX ORDER — AMOXICILLIN AND CLAVULANATE POTASSIUM 875; 125 MG/1; MG/1
1 TABLET, FILM COATED ORAL 2 TIMES DAILY
Qty: 14 TABLET | Refills: 0 | Status: SHIPPED | OUTPATIENT
Start: 2024-02-13

## 2024-02-13 RX ORDER — KETOROLAC TROMETHAMINE 30 MG/ML
15 INJECTION, SOLUTION INTRAMUSCULAR; INTRAVENOUS ONCE
Status: COMPLETED | OUTPATIENT
Start: 2024-02-13 | End: 2024-02-13

## 2024-02-13 RX ADMIN — KETOROLAC TROMETHAMINE 15 MG: 30 INJECTION, SOLUTION INTRAMUSCULAR; INTRAVENOUS at 17:58

## 2024-05-14 ENCOUNTER — TELEMEDICINE (OUTPATIENT)
Dept: FAMILY MEDICINE CLINIC | Facility: TELEHEALTH | Age: 29
End: 2024-05-14
Payer: MEDICAID

## 2024-05-14 DIAGNOSIS — N89.8 VAGINAL ODOR: Primary | ICD-10-CM

## 2024-05-14 DIAGNOSIS — N89.8 VAGINAL ITCHING: ICD-10-CM

## 2024-05-14 PROBLEM — R68.81 EARLY SATIETY: Status: ACTIVE | Noted: 2019-03-20

## 2024-05-14 PROBLEM — R79.89 ELEVATED TESTOSTERONE LEVEL IN FEMALE: Status: ACTIVE | Noted: 2019-03-20

## 2024-05-14 PROBLEM — R10.2 PELVIC PAIN: Status: ACTIVE | Noted: 2019-03-20

## 2024-05-14 PROBLEM — N93.8 DUB (DYSFUNCTIONAL UTERINE BLEEDING): Status: ACTIVE | Noted: 2019-03-20

## 2024-05-14 PROCEDURE — 99213 OFFICE O/P EST LOW 20 MIN: CPT | Performed by: NURSE PRACTITIONER

## 2024-05-14 RX ORDER — BUPROPION HYDROCHLORIDE 150 MG/1
TABLET ORAL
COMMUNITY
Start: 2024-05-08

## 2024-05-14 RX ORDER — NORGESTIMATE AND ETHINYL ESTRADIOL 0.25-0.035
1 KIT ORAL DAILY
COMMUNITY
Start: 2023-09-06 | End: 2024-09-05

## 2024-05-15 NOTE — PROGRESS NOTES
"You have chosen to receive care through a telehealth visit.  Do you consent to use a video/audio connection for your medical care today? Yes     CHIEF COMPLAINT  Chief Complaint   Patient presents with    vaginal odor         HPI  Natalya Huynh is a 28 y.o. female  presents with complaint of \"bad vaginal odor.\" Reports she has to change her clothes several times a day and take 2 showers a day. Reports she is having alittle itching and no burning. Reports she is having frequency. Reports she is having muscles aches and muscle soreness. Reports no fever + chills. No nausea or vomiting. Reports she has not taken any medications for her symptoms. Reports she is not concerned for a STD due to not being sexually active for 2 years. Reports back in 2-2024 she was treated with several rounds of antibiotics for a staph infection.     Review of Systems   Constitutional:  Positive for chills. Negative for fatigue and fever.   HENT:  Negative for congestion, ear discharge, ear pain, sinus pressure, sinus pain and sore throat.    Respiratory:  Negative for cough, chest tightness, shortness of breath and wheezing.    Cardiovascular:  Negative for chest pain.   Gastrointestinal:  Negative for abdominal pain, diarrhea, nausea and vomiting.   Genitourinary:  Positive for frequency and vaginal discharge. Negative for dysuria and urgency.        Small amt of clear discharge   Musculoskeletal:  Positive for myalgias. Negative for back pain.   Neurological:  Negative for dizziness and headaches.   Psychiatric/Behavioral: Negative.         Past Medical History:   Diagnosis Date    Environmental allergies     Migraine     Sinus infection        History reviewed. No pertinent family history.    Social History     Socioeconomic History    Marital status: Single   Tobacco Use    Smoking status: Never     Passive exposure: Current    Smokeless tobacco: Never   Vaping Use    Vaping status: Never Used   Substance and Sexual Activity    Alcohol " Patient's FSGs are controlled on current medication regimen.  Last A1c reviewed-   Lab Results   Component Value Date    HGBA1C 8.0 (H) 12/19/2023     Most recent fingerstick glucose reviewed-   Recent Labs   Lab 12/30/23 2013 12/30/23  2105 12/30/23  2325 12/31/23  1319   POCTGLUCOSE 476* 358* 234* 163*       Current correctional scale  Medium  Maintain anti-hyperglycemic dose as follows-   Antihyperglycemics (From admission, onward)    Start     Stop Route Frequency Ordered    12/31/23 1200  insulin aspart U-100 pen 0-5 Units         -- SubQ Before meals, nightly and at 0200 PRN 12/31/23 1151    12/31/23 0900  insulin detemir U-100 (Levemir) pen 14 Units         -- SubQ 2 times daily 12/31/23 0742    12/31/23 0745  insulin aspart U-100 pen 6 Units         -- SubQ 3 times daily with meals 12/31/23 0744        Hold Oral hypoglycemics while patient is in the hospital.   use: Yes     Comment: socially    Drug use: No    Sexual activity: Defer       Natalya Huynh  reports that she has never smoked. She has been exposed to tobacco smoke. She has never used smokeless tobacco. I have educated her on the risk of diseases from using tobacco products such as cancer, COPD, and heart disease.         I spent  1  minutes counseling the patient.              LMP 05/07/2024   Breastfeeding No     PHYSICAL EXAM  Physical Exam   Constitutional: She is oriented to person, place, and time. She appears well-developed and well-nourished. No distress.   HENT:   Head: Normocephalic and atraumatic.   Right Ear: Hearing normal.   Left Ear: Hearing normal.   Mouth/Throat: Mouth/Lips are normal.  Eyes: Conjunctivae and lids are normal.   Pulmonary/Chest: Effort normal.  No respiratory distress.  Abdominal: There is no abdominal tenderness.   Neurological: She is alert and oriented to person, place, and time.   Psychiatric: She has a normal mood and affect. Her speech is normal and behavior is normal.       Results for orders placed or performed during the hospital encounter of 02/13/24   Comprehensive Metabolic Panel    Specimen: Arm, Left; Blood   Result Value Ref Range    Glucose 76 65 - 99 mg/dL    BUN 10 6 - 20 mg/dL    Creatinine 0.83 0.57 - 1.00 mg/dL    Sodium 141 136 - 145 mmol/L    Potassium 4.1 3.5 - 5.2 mmol/L    Chloride 107 98 - 107 mmol/L    CO2 25.8 22.0 - 29.0 mmol/L    Calcium 8.7 8.6 - 10.5 mg/dL    Total Protein 6.3 6.0 - 8.5 g/dL    Albumin 3.6 3.5 - 5.2 g/dL    ALT (SGPT) 9 1 - 33 U/L    AST (SGOT) 13 1 - 32 U/L    Alkaline Phosphatase 49 39 - 117 U/L    Total Bilirubin 0.3 0.0 - 1.2 mg/dL    Globulin 2.7 gm/dL    A/G Ratio 1.3 g/dL    BUN/Creatinine Ratio 12.0 7.0 - 25.0    Anion Gap 8.2 5.0 - 15.0 mmol/L    eGFR 98.0 >60.0 mL/min/1.73   Protime-INR    Specimen: Arm, Left; Blood   Result Value Ref Range    Protime 12.5 12.1 - 15.0 Seconds    INR 0.93 0.90 - 1.10   aPTT    Specimen:  Arm, Left; Blood   Result Value Ref Range    PTT 26.6 24.3 - 38.1 seconds   Urinalysis With Culture If Indicated - Urine, Clean Catch    Specimen: Urine, Clean Catch   Result Value Ref Range    Color, UA Yellow Yellow, Straw    Appearance, UA Slightly Cloudy (A) Clear    pH, UA 6.0 4.5 - 8.0    Specific Gravity, UA 1.035 (H) 1.003 - 1.030    Glucose, UA Negative Negative    Ketones, UA Negative Negative    Bilirubin, UA Negative Negative    Blood, UA Large (3+) (A) Negative    Protein, UA Negative Negative    Leuk Esterase, UA Negative Negative    Nitrite, UA Negative Negative    Urobilinogen, UA 0.2 E.U./dL 0.2 - 1.0 E.U./dL   BNP    Specimen: Arm, Left; Blood   Result Value Ref Range    proBNP 368.8 0.0 - 450.0 pg/mL   Single High Sensitivity Troponin T    Specimen: Arm, Left; Blood   Result Value Ref Range    HS Troponin T <6 <14 ng/L   D-dimer, Quantitative    Specimen: Arm, Left; Blood   Result Value Ref Range    D-Dimer, Quantitative 0.46 0.00 - 0.50 MCGFEU/mL   CK    Specimen: Arm, Left; Blood   Result Value Ref Range    Creatine Kinase 64 20 - 180 U/L   CBC Auto Differential    Specimen: Blood   Result Value Ref Range    WBC 6.41 3.40 - 10.80 10*3/mm3    RBC 4.17 3.77 - 5.28 10*6/mm3    Hemoglobin 13.4 12.0 - 15.9 g/dL    Hematocrit 40.2 34.0 - 46.6 %    MCV 96.4 79.0 - 97.0 fL    MCH 32.1 26.6 - 33.0 pg    MCHC 33.3 31.5 - 35.7 g/dL    RDW 12.8 12.3 - 15.4 %    RDW-SD 45.1 37.0 - 54.0 fl    MPV 11.7 6.0 - 12.0 fL    Platelets 208 140 - 450 10*3/mm3    Neutrophil % 55.7 42.7 - 76.0 %    Lymphocyte % 29.3 19.6 - 45.3 %    Monocyte % 12.0 5.0 - 12.0 %    Eosinophil % 2.2 0.3 - 6.2 %    Basophil % 0.5 0.0 - 1.5 %    Immature Grans % 0.3 0.0 - 0.5 %    Neutrophils, Absolute 3.57 1.70 - 7.00 10*3/mm3    Lymphocytes, Absolute 1.88 0.70 - 3.10 10*3/mm3    Monocytes, Absolute 0.77 0.10 - 0.90 10*3/mm3    Eosinophils, Absolute 0.14 0.00 - 0.40 10*3/mm3    Basophils, Absolute 0.03 0.00 - 0.20 10*3/mm3    Immature  Grans, Absolute 0.02 0.00 - 0.05 10*3/mm3    nRBC 0.0 0.0 - 0.2 /100 WBC   Urinalysis, Microscopic Only - Urine, Clean Catch    Specimen: Urine, Clean Catch   Result Value Ref Range    RBC, UA 21-50 (A) None Seen, 0-2 /HPF    WBC, UA None Seen None Seen, 0-2 /HPF    Bacteria, UA None Seen None Seen /HPF    Squamous Epithelial Cells, UA 0-2 None Seen, 0-2 /HPF    Hyaline Casts, UA None Seen None Seen /LPF    Methodology Manual Light Microscopy        Diagnoses and all orders for this visit:    1. Vaginal odor (Primary)    2. Vaginal itching    Advised patient to go to Urgent care in the am for poss u/a and urine culture and vaginal swab. Reports she will go to [facility] [address]. Understands if her symptoms worsen go to ER such as high fever, abdominal pain or nausea vomiting.         Monie Martin, APRN  05/14/2024  21:23 EDT    The use of a video visit has been reviewed with the patient and verbal informed consent has been obtained. Myself and Natalya Huynh participated in this visit. The patient is located in [address] [place] [address].    I am located in [place], [place]. [employer] and [employer] were utilized. I spent 5 minutes in the patient's chart for this visit.      Note Disclaimer: At [facility], we believe that sharing information builds trust and better   relationships. You are receiving this note because you recently visited [facility]. It is possible you   will see health information before a provider has talked with you about it. This kind of information can   be easy to misunderstand. To help you fully understand what it means for your health, we urge you to   discuss this note with your provider.

## 2024-08-10 ENCOUNTER — TELEMEDICINE (OUTPATIENT)
Dept: FAMILY MEDICINE CLINIC | Facility: TELEHEALTH | Age: 29
End: 2024-08-10
Payer: MEDICAID

## 2024-08-10 DIAGNOSIS — J06.9 UPPER RESPIRATORY TRACT INFECTION, UNSPECIFIED TYPE: Primary | ICD-10-CM

## 2024-08-10 RX ORDER — DROSPIRENONE AND ETHINYL ESTRADIOL 0.02-3(28)
1 KIT ORAL DAILY
COMMUNITY
Start: 2024-07-16

## 2024-08-10 RX ORDER — AZELAIC ACID 0.15 G/G
1 GEL TOPICAL 2 TIMES DAILY
COMMUNITY
Start: 2024-07-21

## 2024-08-10 RX ORDER — CLINDAMYCIN PHOSPHATE 10 MG/ML
SOLUTION TOPICAL EVERY 12 HOURS SCHEDULED
COMMUNITY
Start: 2024-07-22

## 2024-08-10 RX ORDER — VILAZODONE HYDROCHLORIDE 10 MG/1
20 TABLET ORAL DAILY
COMMUNITY
Start: 2024-07-25

## 2024-08-10 RX ORDER — BUPROPION HYDROCHLORIDE 300 MG/1
300 TABLET ORAL DAILY
COMMUNITY

## 2024-08-10 RX ORDER — COVID-19 ANTIGEN TEST
1 KIT MISCELLANEOUS ONCE
Qty: 1 KIT | Refills: 0 | Status: SHIPPED | OUTPATIENT
Start: 2024-08-10 | End: 2024-08-10

## 2024-08-10 NOTE — PATIENT INSTRUCTIONS
Drink plenty of water  Over the counter pain relievers okay   If symptoms do not improve in 3-5 days follow up with your primary care provider or urgent care  If symptoms worsen follow up with urgent care or the emergency room      Upper Respiratory Infection, Adult  An upper respiratory infection (URI) is a common viral infection of the nose, throat, and upper air passages that lead to the lungs. The most common type of URI is the common cold. URIs usually get better on their own, without medical treatment.  What are the causes?  A URI is caused by a virus. You may catch a virus by:  Breathing in droplets from an infected person's cough or sneeze.  Touching something that has been exposed to the virus (is contaminated) and then touching your mouth, nose, or eyes.  What increases the risk?  You are more likely to get a URI if:  You are very young or very old.  You have close contact with others, such as at work, school, or a health care facility.  You smoke.  You have long-term (chronic) heart or lung disease.  You have a weakened disease-fighting system (immune system).  You have nasal allergies or asthma.  You are experiencing a lot of stress.  You have poor nutrition.  What are the signs or symptoms?  A URI usually involves some of the following symptoms:  Runny or stuffy (congested) nose.  Cough.  Sneezing.  Sore throat.  Headache.  Fatigue.  Fever.  Loss of appetite.  Pain in your forehead, behind your eyes, and over your cheekbones (sinus pain).  Muscle aches.  Redness or irritation of the eyes.  Pressure in the ears or face.  How is this diagnosed?  This condition may be diagnosed based on your medical history and symptoms, and a physical exam. Your health care provider may use a swab to take a mucus sample from your nose (nasal swab). This sample can be tested to determine what virus is causing the illness.  How is this treated?  URIs usually get better on their own within 7-10 days. Medicines cannot cure  URIs, but your health care provider may recommend certain medicines to help relieve symptoms, such as:  Over-the-counter cold medicines.  Cough suppressants. Coughing is a type of defense against infection that helps to clear the respiratory system, so take these medicines only as recommended by your health care provider.  Fever-reducing medicines.  Follow these instructions at home:  Activity  Rest as needed.  If you have a fever, stay home from work or school until your fever is gone or until your health care provider says your URI cannot spread to other people (is no longer contagious). Your health care provider may have you wear a face mask to prevent your infection from spreading.  Relieving symptoms  Gargle with a mixture of salt and water 3-4 times a day or as needed. To make salt water, completely dissolve ½-1 tsp (3-6 g) of salt in 1 cup (237 mL) of warm water.  Use a cool-mist humidifier to add moisture to the air. This can help you breathe more easily.  Eating and drinking    Drink enough fluid to keep your urine pale yellow.  Eat soups and other clear broths.  General instructions    Take over-the-counter and prescription medicines only as told by your health care provider. These include cold medicines, fever reducers, and cough suppressants.  Do not use any products that contain nicotine or tobacco. These products include cigarettes, chewing tobacco, and vaping devices, such as e-cigarettes. If you need help quitting, ask your health care provider.  Stay away from secondhand smoke.  Stay up to date on all immunizations, including the yearly (annual) flu vaccine.  Keep all follow-up visits. This is important.  How to prevent the spread of infection to others  URIs can be contagious. To prevent the infection from spreading:  Wash your hands with soap and water for at least 20 seconds. If soap and water are not available, use hand .  Avoid touching your mouth, face, eyes, or nose.  Cough or sneeze  into a tissue or your sleeve or elbow instead of into your hand or into the air.    Contact a health care provider if:  You are getting worse instead of better.  You have a fever or chills.  Your mucus is brown or red.  You have yellow or brown discharge coming from your nose.  You have pain in your face, especially when you bend forward.  You have swollen neck glands.  You have pain while swallowing.  You have white areas in the back of your throat.  Get help right away if:  You have shortness of breath that gets worse.  You have severe or persistent:  Headache.  Ear pain.  Sinus pain.  Chest pain.  You have chronic lung disease along with any of the following:  Making high-pitched whistling sounds when you breathe, most often when you breathe out (wheezing).  Prolonged cough (more than 14 days).  Coughing up blood.  A change in your usual mucus.  You have a stiff neck.  You have changes in your:  Vision.  Hearing.  Thinking.  Mood.  These symptoms may be an emergency. Get help right away. Call 911.  Do not wait to see if the symptoms will go away.  Do not drive yourself to the hospital.  Summary  An upper respiratory infection (URI) is a common infection of the nose, throat, and upper air passages that lead to the lungs.  A URI is caused by a virus.  URIs usually get better on their own within 7-10 days.  Medicines cannot cure URIs, but your health care provider may recommend certain medicines to help relieve symptoms.  This information is not intended to replace advice given to you by your health care provider. Make sure you discuss any questions you have with your health care provider.  Document Revised: 07/20/2022 Document Reviewed: 07/20/2022  Elsevier Patient Education © 2024 [employer] Inc[employer]

## 2024-08-10 NOTE — PROGRESS NOTES
CHIEF COMPLAINT  Chief Complaint   Patient presents with    Sinusitis         HPI  Natayla Huynh is a 28 y.o. female  presents with complaint of sinus pressure and pain along with rash and body aches for a couple of days.   She feels she has sinusitis and states she and her mother work from home have not had any contact with any person inside or outside there home for 2 weeks.     Review of Systems   Constitutional:  Positive for diaphoresis and fatigue. Negative for chills.   HENT:  Positive for congestion, postnasal drip, sinus pressure and sinus pain.    Musculoskeletal:  Positive for myalgias.       Past Medical History:   Diagnosis Date    Environmental allergies     Migraine     Sinus infection        No family history on file.    Social History     Socioeconomic History    Marital status: Single   Tobacco Use    Smoking status: Never     Passive exposure: Current    Smokeless tobacco: Never   Vaping Use    Vaping status: Never Used   Substance and Sexual Activity    Alcohol use: Yes     Comment: socially    Drug use: No    Sexual activity: Defer         There were no vitals taken for this visit.    PHYSICAL EXAM  Physical Exam   Constitutional: She is oriented to person, place, and time. She appears well-developed and well-nourished. She does not have a sickly appearance. She does not appear ill. No distress.   HENT:   Head: Normocephalic and atraumatic.   Nose: Congestion present.   Eyes: EOM are normal.   Neck: Neck normal appearance.  Pulmonary/Chest: Effort normal.  No respiratory distress.  Neurological: She is alert and oriented to person, place, and time.   Skin: Skin is dry. Rash: faint macular rash on her face.   Psychiatric: She has a normal mood and affect.           Diagnoses and all orders for this visit:    1. Upper respiratory tract infection, unspecified type (Primary)    Other orders  -     COVID-19 At Home Antigen Test (BinaxNOW COVID-19 Ag Home Test) kit; 1 Application by In Vitro route 1  (One) Time for 1 dose.  Dispense: 1 kit; Refill: 0    Recommend COVID-19 testing. If negative will treat for bacterial sinusitis.     The use of a video visit has been reviewed with the patient and verbal informed consent has been obtained. Myself and Natalya Huynh participated in this visit. The patient is located in [address] [place] [address]. I am located in [place], [place]. My[employer] and [employer] were utilized.       Note Disclaimer: At [facility], we believe that sharing information builds trust and better   relationships. You are receiving this note because you recently visited [facility]. It is possible you   will see health information before a provider has talked with you about it. This kind of information can   be easy to misunderstand. To help you fully understand what it means for your health, we urge you to   discuss this note with your provider.    Vanessa Bridges, ELVER  08/10/2024  13:27 EDT